# Patient Record
Sex: FEMALE | Race: WHITE | NOT HISPANIC OR LATINO | URBAN - METROPOLITAN AREA
[De-identification: names, ages, dates, MRNs, and addresses within clinical notes are randomized per-mention and may not be internally consistent; named-entity substitution may affect disease eponyms.]

---

## 2022-07-29 ENCOUNTER — TELEPHONE ENCOUNTER (OUTPATIENT)
Dept: URBAN - METROPOLITAN AREA CLINIC 128 | Facility: CLINIC | Age: 22
End: 2022-07-29

## 2022-08-03 ENCOUNTER — OFFICE VISIT (OUTPATIENT)
Dept: URBAN - METROPOLITAN AREA CLINIC 111 | Facility: CLINIC | Age: 22
End: 2022-08-03
Payer: COMMERCIAL

## 2022-08-03 ENCOUNTER — WEB ENCOUNTER (OUTPATIENT)
Dept: URBAN - METROPOLITAN AREA CLINIC 111 | Facility: CLINIC | Age: 22
End: 2022-08-03

## 2022-08-03 ENCOUNTER — DASHBOARD ENCOUNTERS (OUTPATIENT)
Age: 22
End: 2022-08-03

## 2022-08-03 VITALS
SYSTOLIC BLOOD PRESSURE: 118 MMHG | HEART RATE: 82 BPM | TEMPERATURE: 97 F | BODY MASS INDEX: 20.99 KG/M2 | HEIGHT: 65 IN | WEIGHT: 126 LBS | DIASTOLIC BLOOD PRESSURE: 64 MMHG

## 2022-08-03 DIAGNOSIS — R14.2 BELCHING: ICD-10-CM

## 2022-08-03 DIAGNOSIS — R14.0 ABDOMINAL BLOATING: ICD-10-CM

## 2022-08-03 DIAGNOSIS — K59.09 CHRONIC CONSTIPATION: ICD-10-CM

## 2022-08-03 DIAGNOSIS — K21.9 GASTROESOPHAGEAL REFLUX DISEASE, UNSPECIFIED WHETHER ESOPHAGITIS PRESENT: ICD-10-CM

## 2022-08-03 PROBLEM — 235595009: Status: ACTIVE | Noted: 2022-08-03

## 2022-08-03 PROCEDURE — 99214 OFFICE O/P EST MOD 30 MIN: CPT | Performed by: NURSE PRACTITIONER

## 2022-08-03 RX ORDER — OMEPRAZOLE 20 MG/1
TAKE 1 CAPSULE BY ORAL ROUTE 2 TIMES A DAY CAPSULE, DELAYED RELEASE ORAL 2
Qty: 60 | Refills: 2 | Status: ACTIVE | COMMUNITY
Start: 2020-05-21

## 2022-08-03 RX ORDER — LISDEXAMFETAMINE DIMESYLATE 40 MG/1
TAKE 1 CAPSULE (40 MG) BY ORAL ROUTE ONCE DAILY IN THE MORNING CAPSULE ORAL 1
Qty: 0 | Refills: 0 | Status: ACTIVE | COMMUNITY
Start: 1900-01-01

## 2022-08-03 RX ORDER — OMEPRAZOLE 40 MG/1
1 CAPSULE 30 MINUTES BEFORE MORNING MEAL CAPSULE, DELAYED RELEASE ORAL ONCE A DAY
Qty: 60 | OUTPATIENT
Start: 2022-08-03

## 2022-08-03 NOTE — HPI-TODAY'S VISIT:
23 yo female with hx acid reflux presents with mom for recurrent symptoms. She also has history of constipation that has worsened last one month. Has abd bloating and bleching for about one year. Hx acid reflux since young, used to take Zantac bid with relief. Has nausea and belching, denies vomiting, dysphagia, odynophagia, early satiety, melena, hematochezia or weight loss. Patient was last seen by Dr. Feng on March 6, 2020 for abdominal pain and reflux. EGD at that time revealed focal active esophagitis, bx negative. She took omeprazole at that time with relief. She was born prematuraly at 26 weeks, her twin sister passed away after birth. Recent labs per pcp nml per patient. Denies fh gi malignancy.

## 2023-09-25 ENCOUNTER — APPOINTMENT (OUTPATIENT)
Dept: URBAN - NONMETROPOLITAN AREA CLINIC 45 | Age: 23
Setting detail: DERMATOLOGY
End: 2023-09-26

## 2023-09-25 DIAGNOSIS — L30.9 DERMATITIS, UNSPECIFIED: ICD-10-CM

## 2023-09-25 PROCEDURE — OTHER MIPS QUALITY: OTHER

## 2023-09-25 PROCEDURE — 99202 OFFICE O/P NEW SF 15 MIN: CPT

## 2023-09-25 PROCEDURE — OTHER PRESCRIPTION MEDICATION MANAGEMENT: OTHER

## 2023-09-25 ASSESSMENT — BSA RASH: BSA RASH: 2

## 2023-09-25 ASSESSMENT — ITCH NUMERIC RATING SCALE: HOW SEVERE IS YOUR ITCHING?: 4

## 2023-09-25 ASSESSMENT — LOCATION SIMPLE DESCRIPTION DERM
LOCATION SIMPLE: ABDOMEN
LOCATION SIMPLE: LEFT UPPER BACK

## 2023-09-25 ASSESSMENT — LOCATION DETAILED DESCRIPTION DERM
LOCATION DETAILED: EPIGASTRIC SKIN
LOCATION DETAILED: LEFT INFERIOR MEDIAL UPPER BACK

## 2023-09-25 ASSESSMENT — LOCATION ZONE DERM: LOCATION ZONE: TRUNK

## 2023-09-25 NOTE — PROCEDURE: MIPS QUALITY
Quality 226: Preventive Care And Screening: Tobacco Use: Screening And Cessation Intervention: Patient screened for tobacco use and is an ex/non-smoker
Quality 394b: Td/Tdap Immunizations For Adolescents: Patient had one tetanus, diphtheria toxoids and acellular pertussis vaccine (Tdap) on or between the patient's 10th and 13th birthdays.
Quality 394c: Hpv Vaccine For Adolescents: Patient has had at least two HPV vaccines (with at least 146 days between the two) OR three HPV vaccines on or between the patient’s 9th and 13th birthdays.
Quality 402: Tobacco Use And Help With Quitting Among Adolescents: Patient screened for tobacco and never smoked
Quality 130: Documentation Of Current Medications In The Medical Record: Current Medications Documented
Quality 431: Preventive Care And Screening: Unhealthy Alcohol Use - Screening: Patient not identified as an unhealthy alcohol user when screened for unhealthy alcohol use using a systematic screening method
Quality 394a: Meningococcal Immunizations For Adolescents: Patient had one dose of meningococcal vaccine (serogroups A, C, W, Y) on or between the patient's 11th and 13th birthdays.
Detail Level: Detailed

## 2023-09-25 NOTE — PROCEDURE: PRESCRIPTION MEDICATION MANAGEMENT
Render In Strict Bullet Format?: No
Detail Level: Zone
Plan: Discussed a punch biopsy, but pt declined at this time \\nCont Triamcinolone Cream twice a day x1 week cycle as needed for flares\\nCan use Cerave anti-itch vs Sarna lotion

## 2023-10-09 ENCOUNTER — APPOINTMENT (OUTPATIENT)
Dept: URBAN - NONMETROPOLITAN AREA CLINIC 45 | Age: 23
Setting detail: DERMATOLOGY
End: 2023-10-09

## 2023-10-09 DIAGNOSIS — L30.9 DERMATITIS, UNSPECIFIED: ICD-10-CM

## 2023-10-09 DIAGNOSIS — L259 CONTACT DERMATITIS AND OTHER ECZEMA, UNSPECIFIED CAUSE: ICD-10-CM

## 2023-10-09 PROCEDURE — OTHER PRESCRIPTION MEDICATION MANAGEMENT: OTHER

## 2023-10-09 PROCEDURE — 11104 PUNCH BX SKIN SINGLE LESION: CPT

## 2023-10-09 PROCEDURE — OTHER MIPS QUALITY: OTHER

## 2023-10-09 PROCEDURE — OTHER BIOPSY BY PUNCH METHOD: OTHER

## 2023-10-09 ASSESSMENT — LOCATION DETAILED DESCRIPTION DERM
LOCATION DETAILED: EPIGASTRIC SKIN
LOCATION DETAILED: LEFT INFERIOR MEDIAL UPPER BACK
LOCATION DETAILED: LEFT SUPERIOR MEDIAL LOWER BACK

## 2023-10-09 ASSESSMENT — LOCATION SIMPLE DESCRIPTION DERM
LOCATION SIMPLE: LEFT LOWER BACK
LOCATION SIMPLE: ABDOMEN
LOCATION SIMPLE: LEFT UPPER BACK

## 2023-10-09 ASSESSMENT — LOCATION ZONE DERM: LOCATION ZONE: TRUNK

## 2023-10-09 NOTE — PROCEDURE: PRESCRIPTION MEDICATION MANAGEMENT
Render In Strict Bullet Format?: No
Detail Level: Zone
Plan: Cont Triamcinolone Cream twice a day x1 week cycle as needed for flares\\nCan use Cerave anti-itch vs Sarna lotion (increase emollients)

## 2023-10-18 ENCOUNTER — APPOINTMENT (OUTPATIENT)
Dept: URBAN - NONMETROPOLITAN AREA CLINIC 45 | Age: 23
Setting detail: DERMATOLOGY
End: 2023-10-18

## 2023-10-18 DIAGNOSIS — L259 CONTACT DERMATITIS AND OTHER ECZEMA, UNSPECIFIED CAUSE: ICD-10-CM

## 2023-10-18 DIAGNOSIS — L30.9 DERMATITIS, UNSPECIFIED: ICD-10-CM

## 2023-10-18 PROCEDURE — OTHER PRESCRIPTION MEDICATION MANAGEMENT: OTHER

## 2023-10-18 PROCEDURE — OTHER SUTURE REMOVAL (GLOBAL PERIOD): OTHER

## 2023-10-18 PROCEDURE — OTHER MIPS QUALITY: OTHER

## 2023-10-18 PROCEDURE — 99024 POSTOP FOLLOW-UP VISIT: CPT

## 2023-10-18 ASSESSMENT — LOCATION SIMPLE DESCRIPTION DERM
LOCATION SIMPLE: ABDOMEN
LOCATION SIMPLE: LEFT UPPER BACK
LOCATION SIMPLE: LEFT LOWER BACK

## 2023-10-18 ASSESSMENT — LOCATION DETAILED DESCRIPTION DERM
LOCATION DETAILED: LEFT INFERIOR MEDIAL UPPER BACK
LOCATION DETAILED: LEFT SUPERIOR MEDIAL LOWER BACK
LOCATION DETAILED: EPIGASTRIC SKIN

## 2023-10-18 ASSESSMENT — LOCATION ZONE DERM: LOCATION ZONE: TRUNK

## 2023-10-18 NOTE — PROCEDURE: SUTURE REMOVAL (GLOBAL PERIOD)
Body Location Override (Optional - Billing Will Still Be Based On Selected Body Map Location If Applicable): left superior medial lower back
Detail Level: Detailed
Add 25364 Cpt? (Important Note: In 2017 The Use Of 89391 Is Being Tracked By Cms To Determine Future Global Period Reimbursement For Global Periods): yes

## 2024-06-13 ENCOUNTER — OFFICE VISIT (OUTPATIENT)
Dept: URBAN - METROPOLITAN AREA CLINIC 96 | Facility: CLINIC | Age: 24
End: 2024-06-13
Payer: COMMERCIAL

## 2024-06-13 ENCOUNTER — LAB OUTSIDE AN ENCOUNTER (OUTPATIENT)
Dept: URBAN - METROPOLITAN AREA CLINIC 96 | Facility: CLINIC | Age: 24
End: 2024-06-13

## 2024-06-13 VITALS
WEIGHT: 122.8 LBS | RESPIRATION RATE: 18 BRPM | SYSTOLIC BLOOD PRESSURE: 127 MMHG | BODY MASS INDEX: 20.46 KG/M2 | HEART RATE: 88 BPM | HEIGHT: 65 IN | DIASTOLIC BLOOD PRESSURE: 85 MMHG | TEMPERATURE: 98.1 F

## 2024-06-13 DIAGNOSIS — K21.9 GASTROESOPHAGEAL REFLUX DISEASE, UNSPECIFIED WHETHER ESOPHAGITIS PRESENT: ICD-10-CM

## 2024-06-13 DIAGNOSIS — R10.33 PERIUMBILICAL PAIN: ICD-10-CM

## 2024-06-13 DIAGNOSIS — R14.0 ABDOMINAL BLOATING: ICD-10-CM

## 2024-06-13 DIAGNOSIS — K59.09 CHRONIC CONSTIPATION: ICD-10-CM

## 2024-06-13 PROBLEM — 443503005: Status: ACTIVE | Noted: 2024-06-13

## 2024-06-13 PROBLEM — 236069009: Status: ACTIVE | Noted: 2024-06-13

## 2024-06-13 PROBLEM — 116289008: Status: ACTIVE | Noted: 2024-06-13

## 2024-06-13 PROCEDURE — 99214 OFFICE O/P EST MOD 30 MIN: CPT

## 2024-06-13 RX ORDER — OMEPRAZOLE 20 MG/1
TAKE 1 CAPSULE BY ORAL ROUTE 2 TIMES A DAY CAPSULE, DELAYED RELEASE ORAL 2
Qty: 60 | Refills: 2 | Status: ACTIVE | COMMUNITY
Start: 2020-05-21

## 2024-06-13 RX ORDER — LISDEXAMFETAMINE DIMESYLATE 40 MG/1
TAKE 1 CAPSULE (40 MG) BY ORAL ROUTE ONCE DAILY IN THE MORNING CAPSULE ORAL 1
Qty: 0 | Refills: 0 | Status: ACTIVE | COMMUNITY
Start: 1900-01-01

## 2024-06-13 NOTE — HPI-OTHER HISTORIES
Previously 8/2022 with Dr. Martinez:  23 yo female with hx acid reflux presents with mom for recurrent symptoms. She also has history of constipation that has worsened last one month. Has abd bloating and bleching for about one year. Hx acid reflux since young, used to take Zantac bid with relief. Has nausea and belching, denies vomiting, dysphagia, odynophagia, early satiety, melena, hematochezia or weight loss. Patient was last seen by Dr. Feng on March 6, 2020 for abdominal pain and reflux. EGD at that time revealed focal active esophagitis, bx negative. She took omeprazole at that time with relief. She was born prematuraly at 26 weeks, her twin sister passed away after birth. Recent labs per pcp nml per patient. Denies fh gi malignancy.

## 2024-06-13 NOTE — HPI-TODAY'S VISIT:
24 year old female presents for evaluation of abdominal pain and constipation. More frequent and more intense episodes over the past year. . Constipation has been chronic for years.  Pediatrician recommended mineral oil - which is not helping.  She is currently taking fiber supplements, drinking coffee, and drinking warm prune juice - helps a little. Was told she had IBS "years ago" but "didn't believe the diagosis." Denies melena and blood in stool. Also reports periumbilical pain - for her entire life.  At times, she rates it 10/10 debilitating pain. Believes the symptoms are worse when she is constipated.  She isn't sure if there is an association with her menses.  Admits she has been stressed over the past few months - and has skipped her cycle a few times. Weight is stable. Reports nausea and abdominal bloating.  Denies vomiting. Denies recent abdominal imaging. . Reports she has a complicated medical history.  Born at 26 1/6 weeks - 88 days in NICU -- cardiac cath for pulmonic stenosis.   Reflux her entire life.  Was on Zantac 15 years.  Switched to omeprazole after Zantac recall.  Symtoms moderately controlled.  In her teens, she had an EGD to r/o EoE - which was unremarkable per patient.

## 2024-06-14 ENCOUNTER — TELEPHONE ENCOUNTER (OUTPATIENT)
Dept: URBAN - METROPOLITAN AREA CLINIC 96 | Facility: CLINIC | Age: 24
End: 2024-06-14

## 2024-06-27 ENCOUNTER — TELEPHONE ENCOUNTER (OUTPATIENT)
Dept: URBAN - METROPOLITAN AREA CLINIC 96 | Facility: CLINIC | Age: 24
End: 2024-06-27

## 2024-07-03 ENCOUNTER — OFFICE VISIT (OUTPATIENT)
Dept: URBAN - METROPOLITAN AREA CLINIC 96 | Facility: CLINIC | Age: 24
End: 2024-07-03

## 2024-07-11 ENCOUNTER — OFFICE VISIT (OUTPATIENT)
Dept: URBAN - METROPOLITAN AREA CLINIC 96 | Facility: CLINIC | Age: 24
End: 2024-07-11

## 2024-07-11 RX ORDER — LISDEXAMFETAMINE DIMESYLATE 40 MG/1
TAKE 1 CAPSULE (40 MG) BY ORAL ROUTE ONCE DAILY IN THE MORNING CAPSULE ORAL 1
Qty: 0 | Refills: 0 | COMMUNITY
Start: 1900-01-01

## 2024-07-11 RX ORDER — OMEPRAZOLE 20 MG/1
TAKE 1 CAPSULE BY ORAL ROUTE 2 TIMES A DAY CAPSULE, DELAYED RELEASE ORAL 2
Qty: 60 | Refills: 2 | COMMUNITY
Start: 2020-05-21

## 2024-07-11 RX ORDER — LINACLOTIDE 72 UG/1
1 CAPSULE AT LEAST 30 MINUTES BEFORE THE FIRST MEAL OF THE DAY ON AN EMPTY STOMACH CAPSULE, GELATIN COATED ORAL ONCE A DAY
Qty: 30 | Refills: 5 | COMMUNITY
Start: 2024-07-01 | End: 2024-12-27

## 2024-07-19 ENCOUNTER — OFFICE VISIT (OUTPATIENT)
Dept: URBAN - METROPOLITAN AREA CLINIC 96 | Facility: CLINIC | Age: 24
End: 2024-07-19
Payer: COMMERCIAL

## 2024-07-19 ENCOUNTER — LAB OUTSIDE AN ENCOUNTER (OUTPATIENT)
Dept: URBAN - METROPOLITAN AREA CLINIC 96 | Facility: CLINIC | Age: 24
End: 2024-07-19

## 2024-07-19 VITALS
DIASTOLIC BLOOD PRESSURE: 87 MMHG | BODY MASS INDEX: 20.46 KG/M2 | HEART RATE: 99 BPM | TEMPERATURE: 98.8 F | WEIGHT: 122.8 LBS | HEIGHT: 65 IN | SYSTOLIC BLOOD PRESSURE: 120 MMHG | RESPIRATION RATE: 18 BRPM

## 2024-07-19 DIAGNOSIS — R19.4 CHANGE IN BOWEL HABITS: ICD-10-CM

## 2024-07-19 DIAGNOSIS — K59.09 CHRONIC CONSTIPATION: ICD-10-CM

## 2024-07-19 PROCEDURE — 99213 OFFICE O/P EST LOW 20 MIN: CPT

## 2024-07-19 RX ORDER — LINACLOTIDE 72 UG/1
1 CAPSULE AT LEAST 30 MINUTES BEFORE THE FIRST MEAL OF THE DAY ON AN EMPTY STOMACH CAPSULE, GELATIN COATED ORAL ONCE A DAY
Qty: 30 | Refills: 5 | Status: ACTIVE | COMMUNITY
Start: 2024-07-01 | End: 2024-12-27

## 2024-07-19 RX ORDER — LISDEXAMFETAMINE DIMESYLATE 40 MG/1
TAKE 1 CAPSULE (40 MG) BY ORAL ROUTE ONCE DAILY IN THE MORNING CAPSULE ORAL 1
Qty: 0 | Refills: 0 | Status: ACTIVE | COMMUNITY
Start: 1900-01-01

## 2024-07-19 RX ORDER — OMEPRAZOLE 20 MG/1
TAKE 1 CAPSULE BY ORAL ROUTE 2 TIMES A DAY CAPSULE, DELAYED RELEASE ORAL 2
Qty: 60 | Refills: 2 | Status: ACTIVE | COMMUNITY
Start: 2020-05-21

## 2024-07-19 RX ORDER — LINACLOTIDE 72 UG/1
1 CAPSULE AT LEAST 30 MINUTES BEFORE THE FIRST MEAL OF THE DAY ON AN EMPTY STOMACH CAPSULE, GELATIN COATED ORAL ONCE A DAY
Qty: 30 | Refills: 11 | OUTPATIENT
Start: 2024-07-19 | End: 2025-07-14

## 2024-07-19 NOTE — HPI-OTHER HISTORIES
Previously 6/2024 with VARINDER Lance: 24 year old female presents for evaluation of abdominal pain and constipation. More frequent and more intense episodes over the past year. . Constipation has been chronic for years.  Pediatrician recommended mineral oil - which is not helping.  She is currently taking fiber supplements, drinking coffee, and drinking warm prune juice - helps a little. Was told she had IBS "years ago" but "didn't believe the diagosis." Denies melena and blood in stool. Also reports periumbilical pain - for her entire life.  At times, she rates it 10/10 debilitating pain. Believes the symptoms are worse when she is constipated.  She isn't sure if there is an association with her menses.  Admits she has been stressed over the past few months - and has skipped her cycle a few times. Weight is stable. Reports nausea and abdominal bloating.  Denies vomiting. Denies recent abdominal imaging. . Reports she has a complicated medical history.  Born at 26 1/6 weeks - 88 days in NICU -- cardiac cath for pulmonic stenosis.  Reflux her entire life.  Was on Zantac 15 years.  Switched to omeprazole after Zantac recall.  Symtoms moderately controlled.  In her teens, she had an EGD to r/o EoE - which was unremarkable per patient.  Previously 8/2022 with Dr. Martinez:  23 yo female with hx acid reflux presents with mom for recurrent symptoms. She also has history of constipation that has worsened last one month. Has abd bloating and bleching for about one year. Hx acid reflux since young, used to take Zantac bid with relief. Has nausea and belching, denies vomiting, dysphagia, odynophagia, early satiety, melena, hematochezia or weight loss. Patient was last seen by Dr. Feng on March 6, 2020 for abdominal pain and reflux. EGD at that time revealed focal active esophagitis, bx negative. She took omeprazole at that time with relief. She was born prematuraly at 26 weeks, her twin sister passed away after birth. Recent labs per pcp nml per patient. Denies fh gi malignancy.

## 2024-07-19 NOTE — HPI-TODAY'S VISIT:
24 year old female presents for follow-up. Having more frequent bowel movements but continues to complain of incomplete evacuation. Abdominal pain is still present but intensity has somewhat decreased. Linzess 72mcg provided some relief - but 145 mcg with diarrhea. . CT abdomen pelvis with contrast, 6/25/2024: Unremarkable

## 2024-07-22 PROBLEM — 88111009: Status: ACTIVE | Noted: 2024-07-22

## 2024-08-02 ENCOUNTER — OFFICE VISIT (OUTPATIENT)
Dept: URBAN - METROPOLITAN AREA SURGERY CENTER 18 | Facility: SURGERY CENTER | Age: 24
End: 2024-08-02
Payer: COMMERCIAL

## 2024-08-02 ENCOUNTER — CLAIMS CREATED FROM THE CLAIM WINDOW (OUTPATIENT)
Dept: URBAN - METROPOLITAN AREA CLINIC 4 | Facility: CLINIC | Age: 24
End: 2024-08-02
Payer: COMMERCIAL

## 2024-08-02 VITALS — BODY MASS INDEX: 20.46 KG/M2 | WEIGHT: 122.8 LBS | HEIGHT: 65 IN

## 2024-08-02 DIAGNOSIS — K59.09 CONSTIPATION: ICD-10-CM

## 2024-08-02 DIAGNOSIS — R10.84 GENERALIZED ABDOMINAL PAIN: ICD-10-CM

## 2024-08-02 DIAGNOSIS — D12.3 ADENOMATOUS POLYP OF TRANSVERSE COLON: ICD-10-CM

## 2024-08-02 DIAGNOSIS — D12.3 ADENOMA OF TRANSVERSE COLON: ICD-10-CM

## 2024-08-02 DIAGNOSIS — D12.3 BENIGN NEOPLASM OF TRANSVERSE COLON: ICD-10-CM

## 2024-08-02 DIAGNOSIS — R19.4 CHANGE IN BOWEL HABIT: ICD-10-CM

## 2024-08-02 PROCEDURE — 88305 TISSUE EXAM BY PATHOLOGIST: CPT | Performed by: PATHOLOGY

## 2024-08-02 PROCEDURE — 00811 ANES LWR INTST NDSC NOS: CPT | Performed by: NURSE ANESTHETIST, CERTIFIED REGISTERED

## 2024-08-02 PROCEDURE — 45380 COLONOSCOPY AND BIOPSY: CPT | Performed by: INTERNAL MEDICINE

## 2024-08-02 RX ORDER — OMEPRAZOLE 20 MG/1
TAKE 1 CAPSULE BY ORAL ROUTE 2 TIMES A DAY CAPSULE, DELAYED RELEASE ORAL 2
Qty: 60 | Refills: 2 | Status: ACTIVE | COMMUNITY
Start: 2020-05-21

## 2024-08-02 RX ORDER — LISDEXAMFETAMINE DIMESYLATE 40 MG/1
TAKE 1 CAPSULE (40 MG) BY ORAL ROUTE ONCE DAILY IN THE MORNING CAPSULE ORAL 1
Qty: 0 | Refills: 0 | Status: ACTIVE | COMMUNITY
Start: 1900-01-01

## 2024-08-02 RX ORDER — LINACLOTIDE 72 UG/1
1 CAPSULE AT LEAST 30 MINUTES BEFORE THE FIRST MEAL OF THE DAY ON AN EMPTY STOMACH CAPSULE, GELATIN COATED ORAL ONCE A DAY
Qty: 30 | Refills: 11 | Status: ACTIVE | COMMUNITY
Start: 2024-07-19 | End: 2025-07-14

## 2024-08-02 RX ORDER — LINACLOTIDE 72 UG/1
1 CAPSULE AT LEAST 30 MINUTES BEFORE THE FIRST MEAL OF THE DAY ON AN EMPTY STOMACH CAPSULE, GELATIN COATED ORAL ONCE A DAY
Qty: 30 | Refills: 5 | Status: ACTIVE | COMMUNITY
Start: 2024-07-01 | End: 2024-12-27

## 2024-08-27 ENCOUNTER — OFFICE VISIT (OUTPATIENT)
Dept: URBAN - METROPOLITAN AREA CLINIC 96 | Facility: CLINIC | Age: 24
End: 2024-08-27

## 2024-08-28 ENCOUNTER — OFFICE VISIT (OUTPATIENT)
Dept: URBAN - METROPOLITAN AREA CLINIC 96 | Facility: CLINIC | Age: 24
End: 2024-08-28
Payer: COMMERCIAL

## 2024-08-28 VITALS — HEIGHT: 65 IN | WEIGHT: 125 LBS | BODY MASS INDEX: 20.83 KG/M2

## 2024-08-28 DIAGNOSIS — K21.9 GASTROESOPHAGEAL REFLUX DISEASE, UNSPECIFIED WHETHER ESOPHAGITIS PRESENT: ICD-10-CM

## 2024-08-28 DIAGNOSIS — R14.0 ABDOMINAL BLOATING: ICD-10-CM

## 2024-08-28 DIAGNOSIS — K59.09 CHRONIC CONSTIPATION: ICD-10-CM

## 2024-08-28 DIAGNOSIS — R19.4 CHANGE IN BOWEL HABITS: ICD-10-CM

## 2024-08-28 PROBLEM — 428283002: Status: ACTIVE | Noted: 2024-08-28

## 2024-08-28 PROCEDURE — 99213 OFFICE O/P EST LOW 20 MIN: CPT

## 2024-08-28 RX ORDER — LINACLOTIDE 72 UG/1
1 CAPSULE AT LEAST 30 MINUTES BEFORE THE FIRST MEAL OF THE DAY ON AN EMPTY STOMACH CAPSULE, GELATIN COATED ORAL ONCE A DAY
Qty: 30 | Refills: 5 | Status: ACTIVE | COMMUNITY
Start: 2024-07-01 | End: 2024-12-27

## 2024-08-28 RX ORDER — OMEPRAZOLE 20 MG/1
TAKE 1 CAPSULE BY ORAL ROUTE 2 TIMES A DAY CAPSULE, DELAYED RELEASE ORAL 2
Qty: 60 | Refills: 2 | Status: ACTIVE | COMMUNITY
Start: 2020-05-21

## 2024-08-28 RX ORDER — LISDEXAMFETAMINE DIMESYLATE 40 MG/1
TAKE 1 CAPSULE (40 MG) BY ORAL ROUTE ONCE DAILY IN THE MORNING CAPSULE ORAL 1
Qty: 0 | Refills: 0 | Status: ACTIVE | COMMUNITY
Start: 1900-01-01

## 2024-08-28 RX ORDER — LINACLOTIDE 72 UG/1
1 CAPSULE AT LEAST 30 MINUTES BEFORE THE FIRST MEAL OF THE DAY ON AN EMPTY STOMACH CAPSULE, GELATIN COATED ORAL ONCE A DAY
Qty: 30 | Refills: 11 | Status: ACTIVE | COMMUNITY
Start: 2024-07-19 | End: 2025-07-14

## 2024-08-28 NOTE — HPI-OTHER HISTORIES
Previously 7/2024 with VARINDER Lance: 24 year old female presents for follow-up. Having more frequent bowel movements but continues to complain of incomplete evacuation. Abdominal pain is still present but intensity has somewhat decreased. Linzess 72mcg provided some relief - but 145 mcg with diarrhea. . CT abdomen pelvis with contrast, 6/25/2024: Unremarkable . Previously 6/2024 with VARINDER Lance: 24 year old female presents for evaluation of abdominal pain and constipation. More frequent and more intense episodes over the past year. . Constipation has been chronic for years.  Pediatrician recommended mineral oil - which is not helping.  She is currently taking fiber supplements, drinking coffee, and drinking warm prune juice - helps a little. Was told she had IBS "years ago" but "didn't believe the diagosis." Denies melena and blood in stool. Also reports periumbilical pain - for her entire life.  At times, she rates it 10/10 debilitating pain. Believes the symptoms are worse when she is constipated.  She isn't sure if there is an association with her menses.  Admits she has been stressed over the past few months - and has skipped her cycle a few times. Weight is stable. Reports nausea and abdominal bloating.  Denies vomiting. Denies recent abdominal imaging. . Reports she has a complicated medical history.  Born at 26 1/6 weeks - 88 days in NICU -- cardiac cath for pulmonic stenosis.  Reflux her entire life.  Was on Zantac 15 years.  Switched to omeprazole after Zantac recall.  Symtoms moderately controlled.  In her teens, she had an EGD to r/o EoE - which was unremarkable per patient.  Previously 8/2022 with Dr. Martinez:  21 yo female with hx acid reflux presents with mom for recurrent symptoms. She also has history of constipation that has worsened last one month. Has abd bloating and bleching for about one year. Hx acid reflux since young, used to take Zantac bid with relief. Has nausea and belching, denies vomiting, dysphagia, odynophagia, early satiety, melena, hematochezia or weight loss. Patient was last seen by Dr. Feng on March 6, 2020 for abdominal pain and reflux. EGD at that time revealed focal active esophagitis, bx negative. She took omeprazole at that time with relief. She was born prematuraly at 26 weeks, her twin sister passed away after birth. Recent labs per pcp nml per patient. Denies fh gi malignancy.

## 2024-08-28 NOTE — HPI-TODAY'S VISIT:
24 year old female presents after colonoscopy. Doing well since the procedure. Intermittent rectal discomfort with small volume BRBPR. Having more frequent BMs with Linzess every day . Colonoscopy, 8/2/2024, Dr. Saleh: Hemorrhoids on perianal exam.  Small 4 to 6 mm sessile TA in transverse colon.  Transverse and ascending colon were moderately redundant.  Normal terminal ileum. Repeat 5 years.

## 2025-01-08 ENCOUNTER — TELEPHONE ENCOUNTER (OUTPATIENT)
Dept: URBAN - METROPOLITAN AREA CLINIC 50 | Facility: CLINIC | Age: 25
End: 2025-01-08

## 2025-01-13 ENCOUNTER — TELEPHONE ENCOUNTER (OUTPATIENT)
Dept: URBAN - METROPOLITAN AREA CLINIC 96 | Facility: CLINIC | Age: 25
End: 2025-01-13

## 2025-01-13 RX ORDER — LINACLOTIDE 145 UG/1
1 CAPSULE AT LEAST 30 MINUTES BEFORE THE FIRST MEAL OF THE DAY ON AN EMPTY STOMACH CAPSULE, GELATIN COATED ORAL ONCE A DAY
Qty: 30 | Refills: 3 | OUTPATIENT
Start: 2025-01-13 | End: 2025-05-13

## 2025-02-20 PROBLEM — 440630006: Status: ACTIVE | Noted: 2025-02-20

## 2025-02-20 PROBLEM — 82934008: Status: ACTIVE | Noted: 2025-02-20

## 2025-02-21 ENCOUNTER — LAB OUTSIDE AN ENCOUNTER (OUTPATIENT)
Dept: URBAN - METROPOLITAN AREA TELEHEALTH 2 | Facility: TELEHEALTH | Age: 25
End: 2025-02-21

## 2025-02-21 ENCOUNTER — OFFICE VISIT (OUTPATIENT)
Dept: URBAN - METROPOLITAN AREA TELEHEALTH 2 | Facility: TELEHEALTH | Age: 25
End: 2025-02-21
Payer: COMMERCIAL

## 2025-02-21 VITALS — BODY MASS INDEX: 19.99 KG/M2 | WEIGHT: 120 LBS | HEIGHT: 65 IN

## 2025-02-21 DIAGNOSIS — K58.1 IRRITABLE BOWEL SYNDROME WITH CONSTIPATION: ICD-10-CM

## 2025-02-21 DIAGNOSIS — Z86.0101 PERSONAL HISTORY OF ADENOMATOUS AND SERRATED COLON POLYPS: ICD-10-CM

## 2025-02-21 DIAGNOSIS — R10.13 EPIGASTRIC PAIN: ICD-10-CM

## 2025-02-21 DIAGNOSIS — K21.9 GASTROESOPHAGEAL REFLUX DISEASE, UNSPECIFIED WHETHER ESOPHAGITIS PRESENT: ICD-10-CM

## 2025-02-21 DIAGNOSIS — R63.4 WEIGHT LOSS: ICD-10-CM

## 2025-02-21 PROCEDURE — 99204 OFFICE O/P NEW MOD 45 MIN: CPT | Performed by: INTERNAL MEDICINE

## 2025-02-21 RX ORDER — LISDEXAMFETAMINE DIMESYLATE 40 MG/1
TAKE 1 CAPSULE (40 MG) BY ORAL ROUTE ONCE DAILY IN THE MORNING CAPSULE ORAL 1
Qty: 0 | Refills: 0 | Status: ACTIVE | COMMUNITY
Start: 1900-01-01

## 2025-02-21 RX ORDER — OMEPRAZOLE 20 MG/1
TAKE 1 CAPSULE BY ORAL ROUTE 2 TIMES A DAY CAPSULE, DELAYED RELEASE ORAL 2
Qty: 60 | Refills: 2 | Status: ACTIVE | COMMUNITY
Start: 2020-05-21

## 2025-02-21 RX ORDER — LINACLOTIDE 290 UG/1
1 CAPSULE AT LEAST 30 MINUTES BEFORE THE FIRST MEAL OF THE DAY ON AN EMPTY STOMACH CAPSULE, GELATIN COATED ORAL ONCE A DAY
Qty: 90 CAPSULES | Refills: 3 | OUTPATIENT
Start: 2025-02-21 | End: 2026-02-16

## 2025-02-21 RX ORDER — LINACLOTIDE 72 UG/1
1 CAPSULE AT LEAST 30 MINUTES BEFORE THE FIRST MEAL OF THE DAY ON AN EMPTY STOMACH CAPSULE, GELATIN COATED ORAL ONCE A DAY
Qty: 30 | Refills: 11 | Status: ACTIVE | COMMUNITY
Start: 2024-07-19 | End: 2025-07-14

## 2025-02-21 NOTE — HPI-OTHER HISTORIES
Previously 7/2024 with VARINDER Lance: 24 year old female presents for follow-up. Having more frequent bowel movements but continues to complain of incomplete evacuation. Abdominal pain is still present but intensity has somewhat decreased. Linzess 72mcg provided some relief - but 145 mcg with diarrhea. . CT abdomen pelvis with contrast, 6/25/2024: Unremarkable . Previously 6/2024 with VARINDER Lance: 24 year old female presents for evaluation of abdominal pain and constipation. More frequent and more intense episodes over the past year. . Constipation has been chronic for years.  Pediatrician recommended mineral oil - which is not helping.  She is currently taking fiber supplements, drinking coffee, and drinking warm prune juice - helps a little. Was told she had IBS "years ago" but "didn't believe the diagosis." Denies melena and blood in stool. Also reports periumbilical pain - for her entire life.  At times, she rates it 10/10 debilitating pain. Believes the symptoms are worse when she is constipated.  She isn't sure if there is an association with her menses.  Admits she has been stressed over the past few months - and has skipped her cycle a few times. Weight is stable. Reports nausea and abdominal bloating.  Denies vomiting. Denies recent abdominal imaging. . Reports she has a complicated medical history.  Born at 26 1/6 weeks - 88 days in NICU -- cardiac cath for pulmonic stenosis.  Reflux her entire life.  Was on Zantac 15 years.  Switched to omeprazole after Zantac recall.  Symtoms moderately controlled.  In her teens, she had an EGD to r/o EoE - which was unremarkable per patient.  Previously 8/2022 with Dr. Martinez:  23 yo female with hx acid reflux presents with mom for recurrent symptoms. She also has history of constipation that has worsened last one month. Has abd bloating and bleching for about one year. Hx acid reflux since young, used to take Zantac bid with relief. Has nausea and belching, denies vomiting, dysphagia, odynophagia, early satiety, melena, hematochezia or weight loss. Patient was last seen by Dr. Feng on March 6, 2020 for abdominal pain and reflux. EGD at that time revealed focal active esophagitis, bx negative. She took omeprazole at that time with relief. She was born prematuraly at 26 weeks, her twin sister passed away after birth. Recent labs per pcp nml per patient. Denies fh gi malignancy.

## 2025-02-21 NOTE — HPI-TODAY'S VISIT:
24 year old female presents for a GI fu  Pt saw our [facility] after her colon in 8/2024 and was doing well.  Was having more frequent BMs with Linzess every day .Colonoscopy, 8/2/2024-Hemorrhoids on perianal exam.  Small 4 to 6 mm sessile TA in transverse colon.  Transverse and ascending colon were moderately redundant.  Normal terminal ileum. Repeat 5 years- 8/2029. Todays visit- Pt took linzess 72ug and has been on since August but had to take linzess 145ug and goes now everyday. However still has some pain- 7-8/10 pain usually during her cycle. Not everyday but not without pain despite evacuating much better.  Pt sees gyn and being w/u for PCOS but they did not feel this was endometriosis. She may start BCP for PCOS. Pt is taking PPI and it seems to be working but she says she has some wt loss and fluctuation and not sure about her appetite. Can have some pain still. Pt had an EGD 6yrs ago with peds GI- some inflammation. Has not had repeat egd since then. Had a normal CT scan recently. Pt graduated college and will be doing pre med. She admits to stress and anxiety not sure about depresion. Pt does not have a PCP.

## 2025-02-24 ENCOUNTER — TELEPHONE ENCOUNTER (OUTPATIENT)
Dept: URBAN - METROPOLITAN AREA CLINIC 96 | Facility: CLINIC | Age: 25
End: 2025-02-24

## 2025-02-25 ENCOUNTER — TELEPHONE ENCOUNTER (OUTPATIENT)
Dept: URBAN - METROPOLITAN AREA CLINIC 96 | Facility: CLINIC | Age: 25
End: 2025-02-25

## 2025-04-04 ENCOUNTER — OFFICE VISIT (OUTPATIENT)
Dept: URBAN - METROPOLITAN AREA CLINIC 96 | Facility: CLINIC | Age: 25
End: 2025-04-04